# Patient Record
Sex: FEMALE | Race: WHITE | URBAN - METROPOLITAN AREA
[De-identification: names, ages, dates, MRNs, and addresses within clinical notes are randomized per-mention and may not be internally consistent; named-entity substitution may affect disease eponyms.]

---

## 2017-03-28 ENCOUNTER — HOSPITAL ENCOUNTER (OUTPATIENT)
Dept: BEHAVIORAL HEALTH | Facility: CLINIC | Age: 62
End: 2017-03-28
Attending: PSYCHOLOGIST
Payer: COMMERCIAL

## 2017-03-28 PROCEDURE — 90791 PSYCH DIAGNOSTIC EVALUATION: CPT | Performed by: PSYCHOLOGIST

## 2017-03-28 NOTE — PROGRESS NOTES
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  The intervening years have for the most part been good.  She completed her [employer] training and has had the opportunity to work in a [employer] setting for much of this time.  She has also served a couple of interim positions.  She has taken up running and that has done wonders for her depression.  She has not needed the assistance of medications since we last met.  We talked some about light therapy for the winter months.  Danette came with at at lease three primary concerns.  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  She was passed up for two opportunities and called into two meetings with one understanding of the meeting only to learn the purpose was to criticize her for what she has been doing or not doing.  She feels very little support and has begun to wonder if they are wanting to get younger people on staff.  In the midst of this she has applied for other positions that have not worked out either because another candidate was chosen or they were unable to get the funding.  A second is that her  Michael lost his position as [employer] [employer] coordinator with some [employer] restructuring.  He is now a candidate for a position in [place] and will take it if offered.  This means they would move which could leave Danette with left-over opportunities in the area.  [place] is however a much better place from which to look for a good position.  The third is that their daughter recently  and is moving to [place].  Her daughter living here was a primary reason Danette encouraged Michael to apply for the region three position.  Now she is feeling great sadness over the pending separation from her.   Danette would like to keep meeting for awhile to work through these feelings.  We will meet again in three weeks with the possibility that Michael will know by then if this  opportunity in [place] is going to materialize.

## 2023-05-11 ENCOUNTER — NEW PATIENT COMPREHENSIVE (OUTPATIENT)
Dept: URBAN - METROPOLITAN AREA CLINIC 71 | Facility: CLINIC | Age: 68
End: 2023-05-11

## 2023-05-11 DIAGNOSIS — H52.4: ICD-10-CM

## 2023-05-11 DIAGNOSIS — H25.093: ICD-10-CM

## 2023-05-11 DIAGNOSIS — H04.123: ICD-10-CM

## 2023-05-11 DIAGNOSIS — G43.B0: ICD-10-CM

## 2023-05-11 PROCEDURE — 92015 DETERMINE REFRACTIVE STATE: CPT

## 2023-05-11 PROCEDURE — 92004 COMPRE OPH EXAM NEW PT 1/>: CPT

## 2023-05-11 ASSESSMENT — KERATOMETRY
OD_K1POWER_DIOPTERS: 45.75
OD_AXISANGLE2_DEGREES: 107
OS_AXISANGLE_DEGREES: 161
OS_K2POWER_DIOPTERS: 47.75
OS_AXISANGLE2_DEGREES: 71
OD_AXISANGLE_DEGREES: 17
OD_K2POWER_DIOPTERS: 47.00
OS_K1POWER_DIOPTERS: 46.25

## 2023-05-11 ASSESSMENT — VISUAL ACUITY
OD_CC: 20/30
OS_CC: 20/25-2

## 2024-08-02 ENCOUNTER — ESTABLISHED COMPREHENSIVE EXAM (OUTPATIENT)
Dept: URBAN - METROPOLITAN AREA CLINIC 71 | Facility: CLINIC | Age: 69
End: 2024-08-02

## 2024-08-02 DIAGNOSIS — H25.093: ICD-10-CM

## 2024-08-02 DIAGNOSIS — G43.B0: ICD-10-CM

## 2024-08-02 DIAGNOSIS — H04.123: ICD-10-CM

## 2024-08-02 PROCEDURE — 92014 COMPRE OPH EXAM EST PT 1/>: CPT

## 2024-08-02 ASSESSMENT — VISUAL ACUITY
OD_CC: 20/30+2
OS_CC: 20/20-1

## 2024-08-02 ASSESSMENT — TONOMETRY
OS_IOP_MMHG: 17
OD_IOP_MMHG: 17